# Patient Record
Sex: FEMALE
[De-identification: names, ages, dates, MRNs, and addresses within clinical notes are randomized per-mention and may not be internally consistent; named-entity substitution may affect disease eponyms.]

---

## 2018-03-22 ENCOUNTER — HOSPITAL ENCOUNTER (OUTPATIENT)
Dept: HOSPITAL 88 - US | Age: 38
End: 2018-03-22
Attending: FAMILY MEDICINE
Payer: COMMERCIAL

## 2018-03-22 DIAGNOSIS — D64.9: Primary | ICD-10-CM

## 2018-03-22 PROCEDURE — 76830 TRANSVAGINAL US NON-OB: CPT

## 2018-03-22 NOTE — DIAGNOSTIC IMAGING REPORT
PROCEDURE:

Transabdominal and transvaginal ultrasound imaging of the pelvis was 

performed. 

 

TECHNIQUE:

 

COMPARISON: None.

 

INDICATIONS:   Anemia

 

FINDINGS:

 

KIDNEYS: Unremarkable.

 

UTERUS:  The uterus measures 9.1 x 5.0 x 5.4 cm.  The endometrial 

echocomplex measures 6.0 mm. Nabothian cyst is present. 

 

OVARIES/ADNEXA: 

Right ovary 3.3 x 1.7 x 2.4 cm. Left ovary 2.9 x 1.3 x 2.4 cm.

No solid mass. 

Multiple right ovarian cysts are present, largest measuring 1.6 x 1.0 x 

1.5 cm.

 

PELVIS:  No free fluid.

 

IMPRESSION: 

 

No acute sonographic abnormality.

Right ovarian cysts.

   

 

 

Dictated by:  Shabbir Amanda M.D. on 3/22/2018 at 9:44     

Electronically approved by:  Shabbir Amanda M.D. on 3/22/2018 at 9:45

## 2021-12-23 LAB
ALBUMIN SERPL-MCNC: 4 G/DL (ref 3.5–5)
ALBUMIN/GLOB SERPL: 1.1 {RATIO} (ref 0.8–2)
ALP SERPL-CCNC: 71 IU/L (ref 40–150)
ALT SERPL-CCNC: 34 IU/L (ref 0–55)
ANION GAP SERPL CALC-SCNC: 12.1 MMOL/L (ref 8–16)
BASOPHILS # BLD AUTO: 0 10*3/UL (ref 0–0.1)
BASOPHILS NFR BLD AUTO: 0.6 % (ref 0–1)
BUN SERPL-MCNC: 12 MG/DL (ref 7–26)
BUN/CREAT SERPL: 17 (ref 6–25)
CALCIUM SERPL-MCNC: 8.9 MG/DL (ref 8.4–10.2)
CHLORIDE SERPL-SCNC: 106 MMOL/L (ref 98–107)
CO2 SERPL-SCNC: 25 MMOL/L (ref 22–29)
DEPRECATED NEUTROPHILS # BLD AUTO: 3.8 10*3/UL (ref 2.1–6.9)
EGFRCR SERPLBLD CKD-EPI 2021: 96 ML/MIN (ref 60–?)
EOSINOPHIL # BLD AUTO: 0.1 10*3/UL (ref 0–0.4)
EOSINOPHIL NFR BLD AUTO: 1.4 % (ref 0–6)
ERYTHROCYTE [DISTWIDTH] IN CORD BLOOD: 13.4 % (ref 11.7–14.4)
GLOBULIN PLAS-MCNC: 3.6 G/DL (ref 2.3–3.5)
GLUCOSE SERPLBLD-MCNC: 90 MG/DL (ref 74–118)
HCT VFR BLD AUTO: 43.2 % (ref 34.2–44.1)
HGB BLD-MCNC: 13.8 G/DL (ref 12–16)
LYMPHOCYTES # BLD: 2 10*3/UL (ref 1–3.2)
LYMPHOCYTES NFR BLD AUTO: 30.9 % (ref 18–39.1)
MCH RBC QN AUTO: 28 PG (ref 28–32)
MCHC RBC AUTO-ENTMCNC: 31.9 G/DL (ref 31–35)
MCV RBC AUTO: 87.6 FL (ref 81–99)
MONOCYTES # BLD AUTO: 0.4 10*3/UL (ref 0.2–0.8)
MONOCYTES NFR BLD AUTO: 6.8 % (ref 4.4–11.3)
NEUTS SEG NFR BLD AUTO: 60 % (ref 38.7–80)
PLATELET # BLD AUTO: 227 X10E3/UL (ref 140–360)
POTASSIUM SERPL-SCNC: 4.1 MMOL/L (ref 3.5–5.1)
RBC # BLD AUTO: 4.93 X10E6/UL (ref 3.6–5.1)
SODIUM SERPL-SCNC: 139 MMOL/L (ref 136–145)

## 2021-12-27 ENCOUNTER — HOSPITAL ENCOUNTER (OUTPATIENT)
Dept: HOSPITAL 88 - OR | Age: 41
Discharge: HOME | End: 2021-12-27
Attending: STUDENT IN AN ORGANIZED HEALTH CARE EDUCATION/TRAINING PROGRAM
Payer: COMMERCIAL

## 2021-12-27 VITALS — DIASTOLIC BLOOD PRESSURE: 90 MMHG | SYSTOLIC BLOOD PRESSURE: 155 MMHG

## 2021-12-27 DIAGNOSIS — N39.3: ICD-10-CM

## 2021-12-27 DIAGNOSIS — Z01.812: ICD-10-CM

## 2021-12-27 DIAGNOSIS — N84.0: ICD-10-CM

## 2021-12-27 DIAGNOSIS — N81.10: Primary | ICD-10-CM

## 2021-12-27 DIAGNOSIS — Z20.822: ICD-10-CM

## 2021-12-27 DIAGNOSIS — Z88.6: ICD-10-CM

## 2021-12-27 PROCEDURE — 84702 CHORIONIC GONADOTROPIN TEST: CPT

## 2021-12-27 PROCEDURE — 81025 URINE PREGNANCY TEST: CPT

## 2021-12-27 PROCEDURE — 57240 ANTERIOR COLPORRHAPHY: CPT

## 2021-12-27 PROCEDURE — 57288 REPAIR BLADDER DEFECT: CPT

## 2021-12-27 PROCEDURE — 36415 COLL VENOUS BLD VENIPUNCTURE: CPT

## 2021-12-27 PROCEDURE — 58558 HYSTEROSCOPY BIOPSY: CPT

## 2021-12-27 PROCEDURE — 88305 TISSUE EXAM BY PATHOLOGIST: CPT

## 2021-12-27 PROCEDURE — 80053 COMPREHEN METABOLIC PANEL: CPT

## 2021-12-27 PROCEDURE — 85025 COMPLETE CBC W/AUTO DIFF WBC: CPT
